# Patient Record
Sex: MALE | NOT HISPANIC OR LATINO | Employment: FULL TIME | URBAN - METROPOLITAN AREA
[De-identification: names, ages, dates, MRNs, and addresses within clinical notes are randomized per-mention and may not be internally consistent; named-entity substitution may affect disease eponyms.]

---

## 2019-10-02 ENCOUNTER — TRANSCRIBE ORDERS (OUTPATIENT)
Dept: ADMINISTRATIVE | Facility: HOSPITAL | Age: 24
End: 2019-10-02

## 2019-10-02 DIAGNOSIS — N50.812 LEFT TESTICULAR PAIN: ICD-10-CM

## 2019-10-02 DIAGNOSIS — N45.1 EPIDIDYMITIS: Primary | ICD-10-CM

## 2019-10-11 ENCOUNTER — HOSPITAL ENCOUNTER (OUTPATIENT)
Dept: ULTRASOUND IMAGING | Facility: HOSPITAL | Age: 24
Discharge: HOME | End: 2019-10-11
Admitting: NURSE PRACTITIONER

## 2019-10-11 DIAGNOSIS — N50.812 LEFT TESTICULAR PAIN: ICD-10-CM

## 2019-10-11 DIAGNOSIS — N45.1 EPIDIDYMITIS: ICD-10-CM

## 2019-10-11 PROCEDURE — 76870 US EXAM SCROTUM: CPT

## 2019-12-27 ENCOUNTER — TRANSCRIBE ORDERS (OUTPATIENT)
Dept: PHYSICAL THERAPY | Facility: CLINIC | Age: 24
End: 2019-12-27

## 2019-12-27 DIAGNOSIS — S61.210A LACERATION OF RIGHT INDEX FINGER WITHOUT FOREIGN BODY, NAIL DAMAGE STATUS UNSPECIFIED, INITIAL ENCOUNTER: Primary | ICD-10-CM

## 2019-12-27 DIAGNOSIS — S67.190A CRUSHING INJURY OF RIGHT INDEX FINGER, INITIAL ENCOUNTER: ICD-10-CM

## 2019-12-30 ENCOUNTER — TREATMENT (OUTPATIENT)
Dept: PHYSICAL THERAPY | Facility: CLINIC | Age: 24
End: 2019-12-30

## 2019-12-30 DIAGNOSIS — S61.210A LACERATION OF RIGHT INDEX FINGER WITHOUT FOREIGN BODY, NAIL DAMAGE STATUS UNSPECIFIED, INITIAL ENCOUNTER: Primary | ICD-10-CM

## 2019-12-30 PROCEDURE — 97110 THERAPEUTIC EXERCISES: CPT | Performed by: PHYSICAL THERAPIST

## 2019-12-30 PROCEDURE — 97161 PT EVAL LOW COMPLEX 20 MIN: CPT | Performed by: PHYSICAL THERAPIST

## 2019-12-30 NOTE — PROGRESS NOTES
Physical Therapy Initial Evaluation and Plan of Care      Patient: Kevin Burkett   : 1995  Diagnosis/ICD-10 Code:  Laceration of right index finger without foreign body, nail damage status unspecified, initial encounter [S61.210A]  Referring practitioner: Henrique Connell MD    Subjective Evaluation    History of Present Illness  Mechanism of injury: Patient states he crushed his right index finger at work around 5-6 weeks ago and had to have part of tip of finger amputated.     On one hand duty at work currently.           Patient Occupation: [employer]  Pain  Current pain ratin  At best pain ratin  At worst pain rating: 3  Location: Tip of finger, RIF   Quality: sharp  Aggravating factors: keyboarding, lifting, movement and repetitive movement    Hand dominance: right    Patient Goals  Patient goals for therapy: decreased edema, decreased pain, increased motion, return to sport/leisure activities, independence with ADLs/IADLs, increased strength and return to work  Patient goal: Golf            Objective       Observations     Additional Observation Details  Recently healed incision distal tip of remaining distal phalanx. DIPJ preserved.     Neurological Testing     Sensation     Wrist/Hand     Right   Paresthesia: light touch    Comments   Right light touch: distal tip RIF    Active Range of Motion     Right Wrist   Normal active range of motion    Right Digits   Flexion   Index     DIP: 35 degrees  Extension   Index     DIP: 0 degrees    Additional Active Range of Motion Details  Full active right IF MCP and PIP mobility.     Passive Range of Motion     Right Digits   Flexion   Index     DIP: 65 degrees  Extension   Index     DIP: 0 degrees    Strength/Myotome Testing     Left Wrist/Hand      (2nd hand position)     Trial 1: 145 lbs    Trial 2: 155 lbs    Thumb Strength  Key/Lateral Pinch     Trial 1: 20 lbs  Tip/Two-Point Pinch     Trial 1: 19 lbs  Palmar/Three-Point Pinch     Trial 1:  21 lbs    Right Wrist/Hand   Wrist extension: WFL  Wrist flexion: WFL  Radial deviation: WFL  Ulnar deviation: WFL     (2nd hand position)     Trial 1: 85 lbs    Trial 2: 72 lbs    Thumb Strength   Key/Lateral Pinch     Trial 1: 25 lbs  Tip/Two-Point Pinch     Trial 1: 12 lbs  Palmar/Three-Point Pinch     Trial 1: 16 lbs         Assessment & Plan     Assessment  Impairments: abnormal coordination, abnormal muscle firing, abnormal muscle tone, abnormal or restricted ROM, activity intolerance, impaired physical strength, lacks appropriate home exercise program, pain with function and weight-bearing intolerance  Assessment details: Patient is a 24 year old male presenting with laceration of distal tip of right index finger and nail removal with following an injury at work. Patient presents with decreased right  and pinch strength, decreased DIPJ mobility and paresthesia of surgical area. Patient severely limited at work and with recreational activities due to injury. Patient is appropriate for physical therapy to address the above issues.   Prognosis: good  Prognosis details:           SHORT TERM GOALS:  3 weeks  1.  Pt reports 0/10 pain with finger movement.   2.  Pt has  strength of at least 100 lbs on the right.  3.  Pt has a 15 lb increase in two point pinch strength.        LONG TERM GOALS:    8 weeks   1.  Pt is able to  at least 140 lbs without finger pain.  2.  Pt is able to complete ADLs independently.   3.  Pt will be able to return to work without hand restrictions.     Functional Limitations: carrying objects, lifting, pulling and pushing  Plan  Therapy options: will be seen for skilled physical therapy services  Planned modality interventions: ultrasound, traction, thermotherapy (paraffin bath), thermotherapy (hydrocollator packs), TENS, high voltage pulsed current (spasm management), high voltage pulsed current (pain management), fluidotherapy, cryotherapy and contrast bath  immersion  Planned therapy interventions: therapeutic activities, stretching, strengthening, soft tissue mobilization, postural training, neuromuscular re-education, motor coordination training, manual therapy, abdominal trunk stabilization, ADL retraining, balance/weight-bearing training, body mechanics training, fine motor coordination training, flexibility, functional ROM exercises, home exercise program, IADL retraining and joint mobilization  Frequency: 1-2x/week.  Duration in visits: 14  Treatment plan discussed with: patient        Manual Therapy:         mins  92716;  Therapeutic Exercise:    39     mins  62457;     Neuromuscular John:        mins  16852;    Therapeutic Activity:          mins  31875;     Gait Training:           mins  96828;     Ultrasound:          mins  51311;    Electrical Stimulation:         mins  50191 ( );  Dry Needling         mins self-pay    Timed Treatment:   39   mins   Total Treatment:     57   mins    PT SIGNATURE: Mercedes Hare, PT   DATE TREATMENT INITIATED: 12/30/2019    Initial Certification  Certification Period: 3/29/2020  I certify that the therapy services are furnished while this patient is under my care.  The services outlined above are required by this patient, and will be reviewed every 90 days.     PHYSICIAN: Henrique Connell MD      DATE:     Please sign and return via fax to [phone].. Thank you, [facility].

## 2020-01-06 ENCOUNTER — TREATMENT (OUTPATIENT)
Dept: PHYSICAL THERAPY | Facility: CLINIC | Age: 25
End: 2020-01-06

## 2020-01-06 DIAGNOSIS — S61.210A LACERATION OF RIGHT INDEX FINGER WITHOUT FOREIGN BODY, NAIL DAMAGE STATUS UNSPECIFIED, INITIAL ENCOUNTER: Primary | ICD-10-CM

## 2020-01-06 PROCEDURE — 97110 THERAPEUTIC EXERCISES: CPT | Performed by: PHYSICAL THERAPIST

## 2020-01-06 NOTE — PROGRESS NOTES
Visit #: 2    Subjective   Kevin Burkett reports: Finger feeling a lot better, still sensitive on tip of finger.     Objective   See Exercise, Manual, and Modality Logs for complete treatment.       Assessment/Plan  Patient tolerated exercises well. Sensitivity improved. Will continue working on  and pinch strength.   Progress per Plan of Care           Manual Therapy:         mins  73780;  Therapeutic Exercise:    54     mins  97481;     Neuromuscular John:        mins  84131;    Therapeutic Activity:          mins  30861;     Gait Training:           mins  41934;     Ultrasound:          mins  63707;   Iontophoresis          mins  61703   Electrical Stimulation:         mins  98121 ( );  Dry Needling          mins self-pay  Fluidotherapy          mins 27922    Timed Treatment:  54    mins   Total Treatment:     54   mins    Mercedes Hare PT  Physical Therapist

## 2020-01-10 ENCOUNTER — TREATMENT (OUTPATIENT)
Dept: PHYSICAL THERAPY | Facility: CLINIC | Age: 25
End: 2020-01-10

## 2020-01-10 DIAGNOSIS — S61.210A LACERATION OF RIGHT INDEX FINGER WITHOUT FOREIGN BODY, NAIL DAMAGE STATUS UNSPECIFIED, INITIAL ENCOUNTER: Primary | ICD-10-CM

## 2020-01-10 PROCEDURE — 97110 THERAPEUTIC EXERCISES: CPT | Performed by: PHYSICAL THERAPIST

## 2020-01-10 NOTE — PROGRESS NOTES
Visit #: 3    Subjective   Kevin Burkett reports: Sensitivity continues to improve. No pain.     Objective   Incision healing well, almost completely closed.     See Exercise, Manual, and Modality Logs for complete treatment.       Assessment/Plan  Will start scar massage and progress desensitization when patient returns. Patient is doing well with gripping exercises, will start more pinching exercises next week when distal tip incision is better healed.   Progress per Plan of Care           Manual Therapy:         mins  27336;  Therapeutic Exercise:    53     mins  24934;     Neuromuscular John:        mins  24227;    Therapeutic Activity:          mins  29835;     Gait Training:           mins  27378;     Ultrasound:          mins  46394;   Iontophoresis          mins  45998   Electrical Stimulation:         mins  29361 ( );  Dry Needling          mins self-pay  Fluidotherapy          mins 67950    Timed Treatment:   53   mins   Total Treatment:     53   mins    Mercedes Hare PT  Physical Therapist

## 2020-01-22 ENCOUNTER — TREATMENT (OUTPATIENT)
Dept: PHYSICAL THERAPY | Facility: CLINIC | Age: 25
End: 2020-01-22

## 2020-01-22 DIAGNOSIS — S61.210A LACERATION OF RIGHT INDEX FINGER WITHOUT FOREIGN BODY, NAIL DAMAGE STATUS UNSPECIFIED, INITIAL ENCOUNTER: Primary | ICD-10-CM

## 2020-01-22 PROCEDURE — 97110 THERAPEUTIC EXERCISES: CPT | Performed by: PHYSICAL THERAPIST

## 2020-01-22 NOTE — PROGRESS NOTES
Discharge note     Visit #: 4    Subjective   Kevin Burkett reports: Has returned to full work load without issues. Sensation has improved greatly and feels almost normal. Was able to gold over the weekend without issue. Doesn't feel limited by injury at this time.     Objective      strength:  R: 125 lbs  L: 130 lbs     L Index finger Mobility: WFL at each joint.     Incision site well closed, sensation loss minimal at distal tip. Scar is laying well and does not feel bound down to underlying tissues.     See Exercise, Manual, and Modality Logs for complete treatment.       Assessment/Plan  Patient has made good progress with  strength and function of hand and finger. Sensation and strength both improved. Patient reports no functional limitation with hand and has returned to work and recreational activity with no issues. Patient educated on continuing HEP for  strength and scar massage.   Other  Patient is discharged at this time with all goals met.          Manual Therapy:         mins  65281;  Therapeutic Exercise:    54     mins  59918;     Neuromuscular John:        mins  88668;    Therapeutic Activity:          mins  30408;     Gait Training:           mins  30338;     Ultrasound:          mins  10633;   Iontophoresis          mins  70278   Electrical Stimulation:         mins  49307 ( );  Dry Needling          mins self-pay  Fluidotherapy          mins 23956    Timed Treatment:   54   mins   Total Treatment:     54   mins    Mercedes Hare PT  Physical Therapist

## 2024-02-28 ENCOUNTER — OFFICE VISIT (OUTPATIENT)
Dept: SURGERY | Facility: CLINIC | Age: 29
End: 2024-02-28
Payer: COMMERCIAL

## 2024-02-28 VITALS
WEIGHT: 204.1 LBS | SYSTOLIC BLOOD PRESSURE: 116 MMHG | HEIGHT: 71 IN | DIASTOLIC BLOOD PRESSURE: 72 MMHG | BODY MASS INDEX: 28.57 KG/M2

## 2024-02-28 DIAGNOSIS — D17.1 LIPOMA OF TORSO: ICD-10-CM

## 2024-02-28 DIAGNOSIS — K42.9 UMBILICAL HERNIA WITHOUT OBSTRUCTION AND WITHOUT GANGRENE: Primary | ICD-10-CM

## 2024-02-28 NOTE — PROGRESS NOTES
Subjective   Kevin Burkett is a 28 y.o. male is being seen for consultation today at the request of Oralia Kc APRN    Kevin Burkett is a 28 y.o. male History of Present Illness  With bulge at the umbilicus and a small soft tissue mass in the skin just below the umbilicus.  He does have a small 1 cm fat-containing umbilical hernia with slight protrusion but no obstructive symptoms or severe pain.  Below this the soft tissue mass likely represents lipoma versus sebaceous cyst.  It measures approximately 2.5 cm in greatest dimension.  No previous abdominal surgery reported.  Patient does not smoke.  Hernia  Pertinent negatives include no abdominal pain, chest pain, chills, coughing, fever, headaches, joint swelling, nausea, rash, sore throat, vomiting or weakness.       History reviewed. No pertinent past medical history.    Family History   Problem Relation Age of Onset    No Known Problems Mother     No Known Problems Father        Social History     Socioeconomic History    Marital status:    Tobacco Use    Smoking status: Never     Passive exposure: Never    Smokeless tobacco: Current     Types: Snuff   Vaping Use    Vaping Use: Never used   Substance and Sexual Activity    Alcohol use: Never    Drug use: Never    Sexual activity: Defer       History reviewed. No pertinent surgical history.    Review of Systems   Constitutional:  Negative for activity change, appetite change, chills and fever.   HENT:  Negative for sore throat and trouble swallowing.    Eyes:  Negative for visual disturbance.   Respiratory:  Negative for cough and shortness of breath.    Cardiovascular:  Negative for chest pain and palpitations.   Gastrointestinal:  Negative for abdominal distention, abdominal pain, blood in stool, constipation, diarrhea, nausea and vomiting.   Endocrine: Negative for cold intolerance and heat intolerance.   Genitourinary:  Negative for dysuria.   Musculoskeletal:  Negative for joint swelling.   Skin:   "Negative for color change, rash and wound.   Allergic/Immunologic: Negative for immunocompromised state.   Neurological:  Negative for dizziness, seizures, weakness and headaches.   Hematological:  Negative for adenopathy. Does not bruise/bleed easily.   Psychiatric/Behavioral:  Negative for agitation and confusion.          /72   Ht 180.3 cm (71\")   Wt 92.6 kg (204 lb 1.6 oz)   BMI 28.47 kg/m²   Objective   Physical Exam  Constitutional:       Appearance: He is well-developed.   HENT:      Head: Normocephalic and atraumatic.   Eyes:      Conjunctiva/sclera: Conjunctivae normal.      Pupils: Pupils are equal, round, and reactive to light.   Neck:      Thyroid: No thyromegaly.      Vascular: No JVD.      Trachea: No tracheal deviation.   Cardiovascular:      Rate and Rhythm: Normal rate and regular rhythm.      Heart sounds: No murmur heard.     No friction rub. No gallop.   Pulmonary:      Effort: Pulmonary effort is normal.      Breath sounds: Normal breath sounds.   Abdominal:      General: There is no distension.      Palpations: Abdomen is soft. There is no hepatomegaly or splenomegaly.      Tenderness: There is no abdominal tenderness.      Hernia: No hernia is present.          Comments: Umbilical hernia containing fat but is reducible, soft tissue mass likely representing sebaceous cyst or lipoma just below the umbilicus towards the right of the abdomen.   Musculoskeletal:         General: No deformity. Normal range of motion.      Cervical back: Neck supple.   Skin:     General: Skin is warm and dry.   Neurological:      Mental Status: He is alert and oriented to person, place, and time.               Assessment   Diagnoses and all orders for this visit:    1. Umbilical hernia without obstruction and without gangrene (Primary)  -     Case Request; Standing  -     ceFAZolin (ANCEF) 2,000 mg in sodium chloride 0.9 % 100 mL IVPB  -     Case Request    2. Lipoma of torso  -     Case Request; Standing  - "     ceFAZolin (ANCEF) 2,000 mg in sodium chloride 0.9 % 100 mL IVPB  -     Case Request    Other orders  -     Follow Anesthesia Guidelines / Protocol; Future  -     Follow Anesthesia Guidelines / Protocol; Standing  -     Verify / Perform Chlorhexidine Skin Prep; Standing  -     Verify / Perform Chlorhexidine Skin Prep if Indicated (If Not Already Completed); Standing  -     Obtain Informed Consent; Future  -     Provide NPO Instructions to Patient; Future  -     Chlorhexidine Skin Prep; Future      Kevin Kwadwo is a 28 y.o. male with reducible fat-containing umbilical hernia causing some discomfort in the soft tissue mass likely representing 2.5 cm sebaceous cyst or lipoma of the abdomen.  Patient understands the risks and benefits of surgery and will undergo open umbilical hernia repair with removal of soft tissue mass.

## 2024-03-08 ENCOUNTER — TELEPHONE (OUTPATIENT)
Dept: SURGERY | Facility: CLINIC | Age: 29
End: 2024-03-08
Payer: COMMERCIAL

## 2024-03-08 NOTE — TELEPHONE ENCOUNTER
You are scheduled for surgery with Dr. Cristina on 3/15/24 at 6:30am.   Do not eat/drink anything after midnight the night prior to surgery, and you must have a  present with you on day of surgery.  An appointment for pre-op has been scheduled for 3/13/24 at 8:00am. This is a visit with surgical nurses and the anesthesia team to draw blood work and review your medical history and current medications.  Arrive at outpatient surgery on the ground floor of the hospital both of these days. Outpatient surgery is located on the opposite side of the ER location. Follow the arrows for surgery on the [facility] signs posted along the [facility] hill. If you have any questions please call the office at [phone].

## 2024-03-12 NOTE — DISCHARGE INSTRUCTIONS

## 2024-03-13 ENCOUNTER — PRE-ADMISSION TESTING (OUTPATIENT)
Dept: PREADMISSION TESTING | Facility: HOSPITAL | Age: 29
End: 2024-03-13
Payer: COMMERCIAL

## 2024-03-13 LAB
ANION GAP SERPL CALCULATED.3IONS-SCNC: 9 MMOL/L (ref 5–15)
BUN SERPL-MCNC: 10 MG/DL (ref 6–20)
BUN/CREAT SERPL: 9.8 (ref 7–25)
CALCIUM SPEC-SCNC: 9.5 MG/DL (ref 8.6–10.5)
CHLORIDE SERPL-SCNC: 104 MMOL/L (ref 98–107)
CO2 SERPL-SCNC: 27 MMOL/L (ref 22–29)
CREAT SERPL-MCNC: 1.02 MG/DL (ref 0.76–1.27)
DEPRECATED RDW RBC AUTO: 42.5 FL (ref 37–54)
EGFRCR SERPLBLD CKD-EPI 2021: 102 ML/MIN/1.73
ERYTHROCYTE [DISTWIDTH] IN BLOOD BY AUTOMATED COUNT: 12.2 % (ref 12.3–15.4)
GLUCOSE SERPL-MCNC: 94 MG/DL (ref 65–99)
HCT VFR BLD AUTO: 43.8 % (ref 37.5–51)
HGB BLD-MCNC: 15.4 G/DL (ref 13–17.7)
MCH RBC QN AUTO: 33.3 PG (ref 26.6–33)
MCHC RBC AUTO-ENTMCNC: 35.2 G/DL (ref 31.5–35.7)
MCV RBC AUTO: 94.8 FL (ref 79–97)
PLATELET # BLD AUTO: 172 10*3/MM3 (ref 140–450)
PMV BLD AUTO: 10.4 FL (ref 6–12)
POTASSIUM SERPL-SCNC: 4.8 MMOL/L (ref 3.5–5.2)
RBC # BLD AUTO: 4.62 10*6/MM3 (ref 4.14–5.8)
SODIUM SERPL-SCNC: 140 MMOL/L (ref 136–145)
WBC NRBC COR # BLD AUTO: 4.88 10*3/MM3 (ref 3.4–10.8)

## 2024-03-13 PROCEDURE — 36415 COLL VENOUS BLD VENIPUNCTURE: CPT

## 2024-03-13 PROCEDURE — 85027 COMPLETE CBC AUTOMATED: CPT

## 2024-03-13 PROCEDURE — 80048 BASIC METABOLIC PNL TOTAL CA: CPT

## 2024-03-14 ENCOUNTER — ANESTHESIA EVENT (OUTPATIENT)
Dept: PERIOP | Facility: HOSPITAL | Age: 29
End: 2024-03-14
Payer: COMMERCIAL

## 2024-03-15 ENCOUNTER — HOSPITAL ENCOUNTER (OUTPATIENT)
Facility: HOSPITAL | Age: 29
Setting detail: HOSPITAL OUTPATIENT SURGERY
Discharge: HOME | End: 2024-03-15
Attending: SURGERY | Admitting: SURGERY
Payer: COMMERCIAL

## 2024-03-15 ENCOUNTER — ANESTHESIA (OUTPATIENT)
Dept: PERIOP | Facility: HOSPITAL | Age: 29
End: 2024-03-15
Payer: COMMERCIAL

## 2024-03-15 VITALS
BODY MASS INDEX: 30.13 KG/M2 | SYSTOLIC BLOOD PRESSURE: 132 MMHG | DIASTOLIC BLOOD PRESSURE: 76 MMHG | OXYGEN SATURATION: 98 % | HEIGHT: 71 IN | RESPIRATION RATE: 18 BRPM | WEIGHT: 215.2 LBS | HEART RATE: 71 BPM | TEMPERATURE: 97.9 F

## 2024-03-15 DIAGNOSIS — K42.9 UMBILICAL HERNIA WITHOUT OBSTRUCTION AND WITHOUT GANGRENE: ICD-10-CM

## 2024-03-15 DIAGNOSIS — D17.1 LIPOMA OF TORSO: ICD-10-CM

## 2024-03-15 PROCEDURE — 25010000002 BUPIVACAINE (PF) 0.5 % SOLUTION: Performed by: SURGERY

## 2024-03-15 PROCEDURE — 25010000002 KETOROLAC TROMETHAMINE PER 15 MG: Performed by: NURSE ANESTHETIST, CERTIFIED REGISTERED

## 2024-03-15 PROCEDURE — 25010000002 PROPOFOL 200 MG/20ML EMULSION: Performed by: NURSE ANESTHETIST, CERTIFIED REGISTERED

## 2024-03-15 PROCEDURE — 25810000003 LACTATED RINGERS PER 1000 ML: Performed by: ANESTHESIOLOGY

## 2024-03-15 PROCEDURE — 25010000002 CEFAZOLIN PER 500 MG: Performed by: SURGERY

## 2024-03-15 PROCEDURE — 22902 EXC ABD LES SC < 3 CM: CPT | Performed by: SURGERY

## 2024-03-15 PROCEDURE — 25010000002 FENTANYL CITRATE (PF) 50 MCG/ML SOLUTION: Performed by: NURSE ANESTHETIST, CERTIFIED REGISTERED

## 2024-03-15 PROCEDURE — 49591 RPR AA HRN 1ST < 3 CM RDC: CPT | Performed by: SURGERY

## 2024-03-15 PROCEDURE — C1781 MESH (IMPLANTABLE): HCPCS | Performed by: SURGERY

## 2024-03-15 PROCEDURE — 25010000002 ONDANSETRON PER 1 MG: Performed by: NURSE ANESTHETIST, CERTIFIED REGISTERED

## 2024-03-15 PROCEDURE — 25010000002 MIDAZOLAM PER 1 MG: Performed by: NURSE ANESTHETIST, CERTIFIED REGISTERED

## 2024-03-15 DEVICE — VENTRALEX ST HERNIA PATCH
Type: IMPLANTABLE DEVICE | Site: ABDOMEN | Status: FUNCTIONAL
Brand: VENTRALEX ST HERNIA PATCH

## 2024-03-15 RX ORDER — ONDANSETRON 2 MG/ML
4 INJECTION INTRAMUSCULAR; INTRAVENOUS AS NEEDED
Status: DISCONTINUED | OUTPATIENT
Start: 2024-03-15 | End: 2024-03-15 | Stop reason: HOSPADM

## 2024-03-15 RX ORDER — KETOROLAC TROMETHAMINE 30 MG/ML
INJECTION, SOLUTION INTRAMUSCULAR; INTRAVENOUS AS NEEDED
Status: DISCONTINUED | OUTPATIENT
Start: 2024-03-15 | End: 2024-03-15 | Stop reason: SURG

## 2024-03-15 RX ORDER — SODIUM CHLORIDE 0.9 % (FLUSH) 0.9 %
10 SYRINGE (ML) INJECTION AS NEEDED
Status: DISCONTINUED | OUTPATIENT
Start: 2024-03-15 | End: 2024-03-15 | Stop reason: HOSPADM

## 2024-03-15 RX ORDER — MIDAZOLAM HYDROCHLORIDE 1 MG/ML
INJECTION INTRAMUSCULAR; INTRAVENOUS AS NEEDED
Status: DISCONTINUED | OUTPATIENT
Start: 2024-03-15 | End: 2024-03-15 | Stop reason: SURG

## 2024-03-15 RX ORDER — FENTANYL CITRATE 50 UG/ML
INJECTION, SOLUTION INTRAMUSCULAR; INTRAVENOUS AS NEEDED
Status: DISCONTINUED | OUTPATIENT
Start: 2024-03-15 | End: 2024-03-15 | Stop reason: SURG

## 2024-03-15 RX ORDER — SODIUM CHLORIDE 9 MG/ML
40 INJECTION, SOLUTION INTRAVENOUS AS NEEDED
Status: DISCONTINUED | OUTPATIENT
Start: 2024-03-15 | End: 2024-03-15 | Stop reason: HOSPADM

## 2024-03-15 RX ORDER — ACETAMINOPHEN 325 MG/1
650 TABLET ORAL EVERY 4 HOURS PRN
Qty: 30 TABLET | Refills: 0 | Status: SHIPPED | OUTPATIENT
Start: 2024-03-15

## 2024-03-15 RX ORDER — FAMOTIDINE 10 MG/ML
INJECTION, SOLUTION INTRAVENOUS AS NEEDED
Status: DISCONTINUED | OUTPATIENT
Start: 2024-03-15 | End: 2024-03-15 | Stop reason: SURG

## 2024-03-15 RX ORDER — IBUPROFEN 600 MG/1
600 TABLET ORAL EVERY 6 HOURS PRN
Qty: 30 TABLET | Refills: 0 | Status: SHIPPED | OUTPATIENT
Start: 2024-03-15

## 2024-03-15 RX ORDER — FENTANYL CITRATE 50 UG/ML
50 INJECTION, SOLUTION INTRAMUSCULAR; INTRAVENOUS
Status: DISCONTINUED | OUTPATIENT
Start: 2024-03-15 | End: 2024-03-15 | Stop reason: HOSPADM

## 2024-03-15 RX ORDER — OXYCODONE HYDROCHLORIDE AND ACETAMINOPHEN 5; 325 MG/1; MG/1
1 TABLET ORAL ONCE AS NEEDED
Status: COMPLETED | OUTPATIENT
Start: 2024-03-15 | End: 2024-03-15

## 2024-03-15 RX ORDER — MAGNESIUM HYDROXIDE 1200 MG/15ML
LIQUID ORAL AS NEEDED
Status: DISCONTINUED | OUTPATIENT
Start: 2024-03-15 | End: 2024-03-15 | Stop reason: HOSPADM

## 2024-03-15 RX ORDER — ONDANSETRON 2 MG/ML
INJECTION INTRAMUSCULAR; INTRAVENOUS AS NEEDED
Status: DISCONTINUED | OUTPATIENT
Start: 2024-03-15 | End: 2024-03-15 | Stop reason: SURG

## 2024-03-15 RX ORDER — SODIUM CHLORIDE 0.9 % (FLUSH) 0.9 %
10 SYRINGE (ML) INJECTION EVERY 12 HOURS SCHEDULED
Status: DISCONTINUED | OUTPATIENT
Start: 2024-03-15 | End: 2024-03-15 | Stop reason: HOSPADM

## 2024-03-15 RX ORDER — SODIUM CHLORIDE, SODIUM LACTATE, POTASSIUM CHLORIDE, CALCIUM CHLORIDE 600; 310; 30; 20 MG/100ML; MG/100ML; MG/100ML; MG/100ML
125 INJECTION, SOLUTION INTRAVENOUS ONCE
Status: COMPLETED | OUTPATIENT
Start: 2024-03-15 | End: 2024-03-15

## 2024-03-15 RX ORDER — MEPERIDINE HYDROCHLORIDE 25 MG/ML
12.5 INJECTION INTRAMUSCULAR; INTRAVENOUS; SUBCUTANEOUS
Status: DISCONTINUED | OUTPATIENT
Start: 2024-03-15 | End: 2024-03-15 | Stop reason: HOSPADM

## 2024-03-15 RX ORDER — IPRATROPIUM BROMIDE AND ALBUTEROL SULFATE 2.5; .5 MG/3ML; MG/3ML
3 SOLUTION RESPIRATORY (INHALATION) ONCE AS NEEDED
Status: DISCONTINUED | OUTPATIENT
Start: 2024-03-15 | End: 2024-03-15 | Stop reason: HOSPADM

## 2024-03-15 RX ORDER — SODIUM CHLORIDE, SODIUM LACTATE, POTASSIUM CHLORIDE, CALCIUM CHLORIDE 600; 310; 30; 20 MG/100ML; MG/100ML; MG/100ML; MG/100ML
100 INJECTION, SOLUTION INTRAVENOUS ONCE AS NEEDED
Status: DISCONTINUED | OUTPATIENT
Start: 2024-03-15 | End: 2024-03-15 | Stop reason: HOSPADM

## 2024-03-15 RX ORDER — MIDAZOLAM HYDROCHLORIDE 1 MG/ML
1 INJECTION INTRAMUSCULAR; INTRAVENOUS
Status: DISCONTINUED | OUTPATIENT
Start: 2024-03-15 | End: 2024-03-15 | Stop reason: HOSPADM

## 2024-03-15 RX ORDER — BUPIVACAINE HYDROCHLORIDE 5 MG/ML
INJECTION, SOLUTION EPIDURAL; INTRACAUDAL AS NEEDED
Status: DISCONTINUED | OUTPATIENT
Start: 2024-03-15 | End: 2024-03-15 | Stop reason: HOSPADM

## 2024-03-15 RX ORDER — LIDOCAINE HYDROCHLORIDE 20 MG/ML
INJECTION, SOLUTION EPIDURAL; INFILTRATION; INTRACAUDAL; PERINEURAL AS NEEDED
Status: DISCONTINUED | OUTPATIENT
Start: 2024-03-15 | End: 2024-03-15 | Stop reason: SURG

## 2024-03-15 RX ORDER — TRAMADOL HYDROCHLORIDE 50 MG/1
50 TABLET ORAL EVERY 8 HOURS PRN
Qty: 12 TABLET | Refills: 0 | Status: SHIPPED | OUTPATIENT
Start: 2024-03-15

## 2024-03-15 RX ORDER — PROPOFOL 10 MG/ML
INJECTION, EMULSION INTRAVENOUS AS NEEDED
Status: DISCONTINUED | OUTPATIENT
Start: 2024-03-15 | End: 2024-03-15 | Stop reason: SURG

## 2024-03-15 RX ADMIN — OXYCODONE HYDROCHLORIDE AND ACETAMINOPHEN 1 TABLET: 5; 325 TABLET ORAL at 09:11

## 2024-03-15 RX ADMIN — PROPOFOL 200 MG: 10 INJECTION, EMULSION INTRAVENOUS at 07:37

## 2024-03-15 RX ADMIN — SODIUM CHLORIDE, POTASSIUM CHLORIDE, SODIUM LACTATE AND CALCIUM CHLORIDE: 600; 310; 30; 20 INJECTION, SOLUTION INTRAVENOUS at 07:33

## 2024-03-15 RX ADMIN — FENTANYL CITRATE 50 MCG: 50 INJECTION, SOLUTION INTRAMUSCULAR; INTRAVENOUS at 08:49

## 2024-03-15 RX ADMIN — FENTANYL CITRATE 100 MCG: 50 INJECTION INTRAMUSCULAR; INTRAVENOUS at 07:37

## 2024-03-15 RX ADMIN — LIDOCAINE HYDROCHLORIDE 60 MG: 20 INJECTION, SOLUTION EPIDURAL; INFILTRATION; INTRACAUDAL; PERINEURAL at 07:37

## 2024-03-15 RX ADMIN — ONDANSETRON 4 MG: 2 INJECTION INTRAMUSCULAR; INTRAVENOUS at 08:49

## 2024-03-15 RX ADMIN — ONDANSETRON 4 MG: 2 INJECTION INTRAMUSCULAR; INTRAVENOUS at 07:37

## 2024-03-15 RX ADMIN — CEFAZOLIN 2000 MG: 2 INJECTION, POWDER, FOR SOLUTION INTRAMUSCULAR; INTRAVENOUS at 07:33

## 2024-03-15 RX ADMIN — FENTANYL CITRATE 100 MCG: 50 INJECTION INTRAMUSCULAR; INTRAVENOUS at 07:51

## 2024-03-15 RX ADMIN — MIDAZOLAM HYDROCHLORIDE 2 MG: 1 INJECTION, SOLUTION INTRAMUSCULAR; INTRAVENOUS at 07:33

## 2024-03-15 RX ADMIN — FAMOTIDINE 20 MG: 10 INJECTION, SOLUTION INTRAVENOUS at 07:33

## 2024-03-15 RX ADMIN — KETOROLAC TROMETHAMINE 30 MG: 30 INJECTION, SOLUTION INTRAMUSCULAR at 08:19

## 2024-03-15 NOTE — ANESTHESIA POSTPROCEDURE EVALUATION
Patient: Kevin Burkett    Procedure Summary       Date: 03/15/24 Room / Location: [facility] OR  /  [facility] OR    Anesthesia Start: 0733 Anesthesia Stop: 0830    Procedures:       UMBILICAL HERNIA REPAIR (Abdomen)      LIPOMA EXCISION (Abdomen) Diagnosis:       Umbilical hernia without obstruction and without gangrene      Lipoma of torso      (Umbilical hernia without obstruction and without gangrene [K42.9])      (Lipoma of torso [D17.1])    Surgeons: Winston Cristina MD Provider: Bucky Ballesteros DO    Anesthesia Type: general ASA Status: 1            Anesthesia Type: general    Vitals  Vitals Value Taken Time   /89 03/15/24 0903   Temp 97.8 °F (36.6 °C) 03/15/24 0833   Pulse 72 03/15/24 0904   Resp 15 03/15/24 0903   SpO2 93 % 03/15/24 0904   Vitals shown include unfiled device data.        Post Anesthesia Care and Evaluation    Patient location during evaluation: PACU  Patient participation: complete - patient participated  Level of consciousness: awake  Pain score: 0    Airway patency: patent  Anesthetic complications: No anesthetic complications  PONV Status: none  Cardiovascular status: hemodynamically stable  Respiratory status: nasal cannula  Hydration status: acceptable

## 2024-03-15 NOTE — ANESTHESIA PREPROCEDURE EVALUATION
Anesthesia Evaluation     Patient summary reviewed and Nursing notes reviewed   no history of anesthetic complications:   NPO Solid Status: > 8 hours  NPO Liquid Status: > 8 hours           Airway   Mallampati: II  TM distance: >3 FB  Neck ROM: full  No difficulty expected  Dental - normal exam     Pulmonary - negative pulmonary ROS and normal exam    breath sounds clear to auscultation  Cardiovascular - negative cardio ROS and normal exam    Rhythm: regular  Rate: normal        Neuro/Psych- negative ROS  GI/Hepatic/Renal/Endo - negative ROS     Musculoskeletal (-) negative ROS    Abdominal  - normal exam   Substance History - negative use     OB/GYN negative ob/gyn ROS         Other - negative ROS           Phys Exam Other: + beard              Anesthesia Plan    ASA 1     general     (Mr. Burkett is consented for TAP blocks for post op pain control. )  intravenous induction     Anesthetic plan, risks, benefits, and alternatives have been provided, discussed and informed consent has been obtained with: patient.  Pre-procedure education provided  Plan discussed with CRNA.    CODE STATUS:

## 2024-03-15 NOTE — ANESTHESIA PROCEDURE NOTES
Airway  Urgency: elective    Date/Time: 3/15/2024 7:38 AM    General Information and Staff    Patient location during procedure: OR  CRNA/CAA: Gianna Gonzalez CRNA    Indications and Patient Condition    Preoxygenated: yes      Final Airway Details  Final airway type: supraglottic airway      Successful airway: unique  Size 4     Number of attempts at approach: 1  Assessment: lips, teeth, and gum same as pre-op and atraumatic intubation

## 2024-03-15 NOTE — OP NOTE
UMBILICAL HERNIA REPAIR, TRUNK LESION/CYST EXCISION  Procedure Note    Kevin Kwadwo  3/15/2024    Pre-op Diagnosis:   Umbilical hernia without obstruction and without gangrene [K42.9]  Lipoma of torso [D17.1]    Post-op Diagnosis:     Post-Op Diagnosis Codes:     * Umbilical hernia without obstruction and without gangrene [K42.9]     * Lipoma of torso [D17.1]    Procedure(s):  UMBILICAL HERNIA REPAIR  LIPOMA EXCISION    Surgeon(s):  Winston Cristina MD    Anesthesia: General    Staff:   Circulator: Shirley Whitfield RN  Scrub Person: Julieta Deshpande  Assistant: Chandrika Duke    Findings:   2.5 cm umbilical hernia with preperitoneal fat  2.5 cm lipoma just inferior to the umbilicus     Operative Procedure:    The patient was taken operating suite placed upon operating table.  Bilateral sequential compression devices were applied and general anesthesia was administered.  The abdomen was prepped and draped in usual sterile fashion.  Preoperative antibiotics were confirmed.  Timeout procedure was performed.  After injection of local anesthetic a curvilinear infraumbilical incision was made and dissection was carried down to the anterior rectus sheath.  There was a palpable 2.5 cm lipoma just below the level of the incision which was removed from the surrounding normal fat and sent to pathology.  At this time circumferential control around the hernia and umbilical stalk was obtained.  The hernia was dissected free from the umbilical dermis and the umbilical stalk divided.  Preperitoneal fat was seen and a large portion to be herniating through the defect and the fascial edges were freed from the herniated preperitoneal fat of which some debulking was performed and sent to pathology and the remainder was reduced back into the preperitoneal space.  Blunt dissection created a space for mesh placement and a 6 cm Ventralight mesh patch was placed into the preperitoneal space beneath the defect.  The defect was  then approximated incorporating the mesh into the repair with interrupted Nurolon sutures.  Following approximation of the fascial edges and closure the tails of the mesh were cut to the level of the incision and the wound was irrigated and made hemostatic with cautery.  At this time the umbilical dermis was tacked to the anterior sheath with a Vicryl suture and the skin was closed with deep dermal Vicryl followed by Monocryl subcuticular skin approximation.  A pressure dressing was placed at the umbilicus and the patient was awakened from anesthesia and taken to recovery.    Estimated Blood Loss: 5 mL    Specimens:   Hernia contents           Drains: None    Grafts/Implants: 6 cm Ventralight umbilical patch    Complications: None           Winston Cristina MD     Date: 3/15/2024  Time: 08:27 EDT

## 2024-03-19 LAB — REF LAB TEST METHOD: NORMAL

## 2024-04-09 ENCOUNTER — OFFICE VISIT (OUTPATIENT)
Dept: SURGERY | Facility: CLINIC | Age: 29
End: 2024-04-09
Payer: COMMERCIAL

## 2024-04-09 VITALS — HEIGHT: 71 IN | WEIGHT: 215 LBS | BODY MASS INDEX: 30.1 KG/M2

## 2024-04-09 DIAGNOSIS — D17.1 LIPOMA OF TORSO: Primary | ICD-10-CM

## 2024-04-09 DIAGNOSIS — K42.9 UMBILICAL HERNIA WITHOUT OBSTRUCTION AND WITHOUT GANGRENE: ICD-10-CM

## 2024-04-09 PROCEDURE — 99024 POSTOP FOLLOW-UP VISIT: CPT | Performed by: SURGERY

## 2024-04-09 NOTE — PROGRESS NOTES
Subjective   Kevin Burkett is a 28 y.o. male  is here today for follow-up.         Kevin Burkett is a 28 y.o. male here for follow up after open umbilical hernia repair and removal of the lipoma from the periumbilical soft tissue.  Patient is doing well without complication or recurrence.    Physical Exam  Umbilical hernia repair intact, no complication or recurrence.            Assessment     Diagnoses and all orders for this visit:    1. Lipoma of torso (Primary)    2. Umbilical hernia without obstruction and without gangrene      Kevin Burkett is a 28 y.o. male doing well after open umbilical hernia repair and removal of adjacent soft tissue lipoma.  Continue 6-week lifting restriction from date of surgery and then can resume normal activity without restriction.

## (undated) DEVICE — SUT NUROLON 0 MO7 CR8 18IN C541D

## (undated) DEVICE — SUT MNCRYL 4/0 PS2 18 IN

## (undated) DEVICE — TBG PENCL TELESCP MEGADYNE SMOKE EVAC 10FT

## (undated) DEVICE — STERILE COTTON BALLS LARGE 5/P: Brand: MEDLINE

## (undated) DEVICE — SUT VIC 3/0 SH 27IN J416H

## (undated) DEVICE — DBD-DRAPE,LAP,CHOLE,W/TROUGHS,STERILE: Brand: MEDLINE

## (undated) DEVICE — SPNG LAP PREWSH SFTPK 18X18IN STRL PK/5

## (undated) DEVICE — GLV SURG SENSICARE W/ALOE PF LF 7.5 STRL

## (undated) DEVICE — PK BASIC 70

## (undated) DEVICE — GLV SURG PREMIERPRO MIC LTX PF SZ8 BRN

## (undated) DEVICE — GLV SURG PREMIERPRO MIC LTX PF SZ7.5 BRN

## (undated) DEVICE — PATIENT RETURN ELECTRODE, SINGLE-USE, CONTACT QUALITY MONITORING, ADULT, WITH 9FT CORD, FOR PATIENTS WEIGING OVER 33LBS. (15KG): Brand: MEGADYNE

## (undated) DEVICE — HOLDER: Brand: DEROYAL

## (undated) DEVICE — DRSNG PAD ABD 8X10IN STRL

## (undated) DEVICE — DRSNG SURESITE WNDW 4X4.5

## (undated) DEVICE — GLV SURG SENSICARE W/ALOE PF LF 8 STRL